# Patient Record
Sex: MALE | Race: WHITE | Employment: OTHER | URBAN - METROPOLITAN AREA
[De-identification: names, ages, dates, MRNs, and addresses within clinical notes are randomized per-mention and may not be internally consistent; named-entity substitution may affect disease eponyms.]

---

## 2017-08-16 PROCEDURE — 81003 URINALYSIS AUTO W/O SCOPE: CPT | Performed by: FAMILY MEDICINE

## 2018-01-04 PROBLEM — F17.200 SMOKER: Status: ACTIVE | Noted: 2018-01-04

## 2018-01-04 PROBLEM — R07.2 PRECORDIAL PAIN: Status: ACTIVE | Noted: 2018-01-04

## 2018-01-04 PROBLEM — R94.39 ABNORMAL STRESS TEST: Status: ACTIVE | Noted: 2018-01-04

## 2018-01-05 ENCOUNTER — APPOINTMENT (OUTPATIENT)
Dept: LAB | Facility: HOSPITAL | Age: 58
End: 2018-01-05
Attending: INTERNAL MEDICINE
Payer: COMMERCIAL

## 2018-01-05 PROCEDURE — 80048 BASIC METABOLIC PNL TOTAL CA: CPT

## 2018-01-05 PROCEDURE — 85027 COMPLETE CBC AUTOMATED: CPT

## 2018-01-05 PROCEDURE — 36415 COLL VENOUS BLD VENIPUNCTURE: CPT

## 2018-01-05 RX ORDER — SODIUM CHLORIDE 9 MG/ML
INJECTION, SOLUTION INTRAVENOUS ONCE
Status: DISCONTINUED | OUTPATIENT
Start: 2018-01-08 | End: 2018-01-08

## 2018-01-05 RX ORDER — CALCIUM POLYCARBOPHIL 625 MG
1 TABLET ORAL EVERY EVENING
COMMUNITY
End: 2019-02-18 | Stop reason: ALTCHOICE

## 2018-01-08 ENCOUNTER — HOSPITAL ENCOUNTER (OUTPATIENT)
Dept: INTERVENTIONAL RADIOLOGY/VASCULAR | Facility: HOSPITAL | Age: 58
Discharge: HOME | End: 2018-01-08
Attending: INTERNAL MEDICINE | Admitting: INTERNAL MEDICINE
Payer: COMMERCIAL

## 2018-01-08 VITALS
HEIGHT: 70 IN | SYSTOLIC BLOOD PRESSURE: 135 MMHG | OXYGEN SATURATION: 99 % | RESPIRATION RATE: 15 BRPM | WEIGHT: 270.06 LBS | HEART RATE: 71 BPM | DIASTOLIC BLOOD PRESSURE: 85 MMHG | BODY MASS INDEX: 38.66 KG/M2

## 2018-01-08 DIAGNOSIS — R07.9 CHEST PAIN: ICD-10-CM

## 2018-01-08 DIAGNOSIS — R94.39 ABNORMAL STRESS ECHO: ICD-10-CM

## 2018-01-08 PROCEDURE — 93005 ELECTROCARDIOGRAM TRACING: CPT

## 2018-01-08 PROCEDURE — 93010 ELECTROCARDIOGRAM REPORT: CPT | Performed by: INTERNAL MEDICINE

## 2018-01-08 PROCEDURE — 93458 L HRT ARTERY/VENTRICLE ANGIO: CPT

## 2018-01-08 PROCEDURE — B2151ZZ FLUOROSCOPY OF LEFT HEART USING LOW OSMOLAR CONTRAST: ICD-10-PCS | Performed by: INTERNAL MEDICINE

## 2018-01-08 PROCEDURE — 027034Z DILATION OF CORONARY ARTERY, ONE ARTERY WITH DRUG-ELUTING INTRALUMINAL DEVICE, PERCUTANEOUS APPROACH: ICD-10-PCS | Performed by: INTERNAL MEDICINE

## 2018-01-08 PROCEDURE — 92928 PRQ TCAT PLMT NTRAC ST 1 LES: CPT

## 2018-01-08 PROCEDURE — 99152 MOD SED SAME PHYS/QHP 5/>YRS: CPT

## 2018-01-08 PROCEDURE — 4A023N7 MEASUREMENT OF CARDIAC SAMPLING AND PRESSURE, LEFT HEART, PERCUTANEOUS APPROACH: ICD-10-PCS | Performed by: INTERNAL MEDICINE

## 2018-01-08 PROCEDURE — B2111ZZ FLUOROSCOPY OF MULTIPLE CORONARY ARTERIES USING LOW OSMOLAR CONTRAST: ICD-10-PCS | Performed by: INTERNAL MEDICINE

## 2018-01-08 PROCEDURE — 99153 MOD SED SAME PHYS/QHP EA: CPT

## 2018-01-08 RX ORDER — SODIUM CHLORIDE 9 MG/ML
INJECTION, SOLUTION INTRAVENOUS
Status: COMPLETED
Start: 2018-01-08 | End: 2018-01-08

## 2018-01-08 RX ORDER — NITROGLYCERIN 20 MG/100ML
INJECTION INTRAVENOUS
Status: DISCONTINUED
Start: 2018-01-08 | End: 2018-01-08 | Stop reason: WASHOUT

## 2018-01-08 RX ORDER — ASPIRIN 81 MG/1
81 TABLET, CHEWABLE ORAL DAILY
Status: DISCONTINUED | OUTPATIENT
Start: 2018-01-09 | End: 2018-01-08

## 2018-01-08 RX ORDER — SODIUM CHLORIDE 9 MG/ML
INJECTION, SOLUTION INTRAVENOUS CONTINUOUS
Status: ACTIVE | OUTPATIENT
Start: 2018-01-08 | End: 2018-01-08

## 2018-01-08 RX ORDER — SODIUM CHLORIDE 9 MG/ML
INJECTION, SOLUTION INTRAVENOUS
Status: DISCONTINUED
Start: 2018-01-08 | End: 2018-01-08

## 2018-01-08 RX ORDER — HEPARIN SODIUM 1000 [USP'U]/ML
INJECTION, SOLUTION INTRAVENOUS; SUBCUTANEOUS
Status: COMPLETED
Start: 2018-01-08 | End: 2018-01-08

## 2018-01-08 RX ORDER — CLOPIDOGREL BISULFATE 75 MG/1
75 TABLET ORAL DAILY
Qty: 30 TABLET | Refills: 0 | Status: SHIPPED | OUTPATIENT
Start: 2018-01-09 | End: 2018-04-27

## 2018-01-08 RX ORDER — CLOPIDOGREL BISULFATE 75 MG/1
75 TABLET ORAL DAILY
Status: DISCONTINUED | OUTPATIENT
Start: 2018-01-09 | End: 2018-01-08

## 2018-01-08 RX ORDER — LIDOCAINE HYDROCHLORIDE 20 MG/ML
INJECTION, SOLUTION EPIDURAL; INFILTRATION; INTRACAUDAL; PERINEURAL
Status: COMPLETED
Start: 2018-01-08 | End: 2018-01-08

## 2018-01-08 RX ORDER — MIDAZOLAM HYDROCHLORIDE 1 MG/ML
INJECTION INTRAMUSCULAR; INTRAVENOUS
Status: COMPLETED
Start: 2018-01-08 | End: 2018-01-08

## 2018-01-08 RX ORDER — CLOPIDOGREL BISULFATE 75 MG/1
TABLET ORAL
Status: COMPLETED
Start: 2018-01-08 | End: 2018-01-08

## 2018-01-08 NOTE — PROGRESS NOTES
Outpatient Surgery Brief Discharge Summary         Patient ID:  Elbert Toure  [id number]  59year old  10/12/1960    Discharge Diagnoses: unstable angina  Procedures: l,lv,cor  ptca circ, unsuccessful; stent lad    Discharged Condition: stable    Disposi

## 2018-01-09 NOTE — PROGRESS NOTES
1/15/2018  4:00 PM    Jayla Ann MD EL CARD       Elm Mcadoo  1/16/2018  11:00 AM   [facility] CARD ORIENTATION       [facility] CP REHAB   [facility]

## 2018-01-15 PROBLEM — I25.10 CORONARY ARTERY DISEASE INVOLVING NATIVE CORONARY ARTERY: Status: ACTIVE | Noted: 2018-01-15

## 2018-01-16 ENCOUNTER — CARDPULM VISIT (OUTPATIENT)
Dept: CARDIAC REHAB | Facility: HOSPITAL | Age: 58
End: 2018-01-16
Attending: INTERNAL MEDICINE
Payer: COMMERCIAL

## 2018-01-16 DIAGNOSIS — Z95.5 STENTED CORONARY ARTERY: ICD-10-CM

## 2018-01-16 PROCEDURE — 93798 PHYS/QHP OP CAR RHAB W/ECG: CPT

## 2018-01-31 ENCOUNTER — APPOINTMENT (OUTPATIENT)
Dept: CARDIAC REHAB | Facility: HOSPITAL | Age: 58
End: 2018-01-31
Attending: INTERNAL MEDICINE
Payer: COMMERCIAL

## 2018-02-06 ENCOUNTER — LAB ENCOUNTER (OUTPATIENT)
Dept: LAB | Facility: HOSPITAL | Age: 58
End: 2018-02-06
Attending: INTERNAL MEDICINE
Payer: COMMERCIAL

## 2018-02-06 DIAGNOSIS — I24.0 CORONARY ARTERY OCCLUSION (HCC): ICD-10-CM

## 2018-02-06 DIAGNOSIS — I25.10 CORONARY ARTERY DISEASE INVOLVING NATIVE CORONARY ARTERY OF NATIVE HEART WITHOUT ANGINA PECTORIS: ICD-10-CM

## 2018-02-06 LAB
ALBUMIN SERPL BCP-MCNC: 4.1 G/DL (ref 3.5–4.8)
ALBUMIN/GLOB SERPL: 1.2 {RATIO} (ref 1–2)
ALP SERPL-CCNC: 41 U/L (ref 32–100)
ALT SERPL-CCNC: 25 U/L (ref 17–63)
ANION GAP SERPL CALC-SCNC: 7 MMOL/L (ref 0–18)
AST SERPL-CCNC: 25 U/L (ref 15–41)
BASOPHILS # BLD: 0 K/UL (ref 0–0.2)
BASOPHILS NFR BLD: 1 %
BILIRUB SERPL-MCNC: 0.4 MG/DL (ref 0.3–1.2)
BUN SERPL-MCNC: 11 MG/DL (ref 8–20)
BUN/CREAT SERPL: 9 (ref 10–20)
CALCIUM SERPL-MCNC: 9.2 MG/DL (ref 8.5–10.5)
CHLORIDE SERPL-SCNC: 103 MMOL/L (ref 95–110)
CO2 SERPL-SCNC: 27 MMOL/L (ref 22–32)
CREAT SERPL-MCNC: 1.22 MG/DL (ref 0.5–1.5)
EOSINOPHIL # BLD: 0.2 K/UL (ref 0–0.7)
EOSINOPHIL NFR BLD: 3 %
ERYTHROCYTE [DISTWIDTH] IN BLOOD BY AUTOMATED COUNT: 13.9 % (ref 11–15)
GLOBULIN PLAS-MCNC: 3.5 G/DL (ref 2.5–3.7)
GLUCOSE SERPL-MCNC: 96 MG/DL (ref 70–99)
HCT VFR BLD AUTO: 42.4 % (ref 41–52)
HGB BLD-MCNC: 14.2 G/DL (ref 13.5–17.5)
LYMPHOCYTES # BLD: 1.8 K/UL (ref 1–4)
LYMPHOCYTES NFR BLD: 23 %
MCH RBC QN AUTO: 30.4 PG (ref 27–32)
MCHC RBC AUTO-ENTMCNC: 33.6 G/DL (ref 32–37)
MCV RBC AUTO: 90.5 FL (ref 80–100)
MONOCYTES # BLD: 0.8 K/UL (ref 0–1)
MONOCYTES NFR BLD: 10 %
NEUTROPHILS # BLD AUTO: 4.9 K/UL (ref 1.8–7.7)
NEUTROPHILS NFR BLD: 64 %
OSMOLALITY UR CALC.SUM OF ELEC: 283 MOSM/KG (ref 275–295)
PATIENT FASTING: NO
PLATELET # BLD AUTO: 240 K/UL (ref 140–400)
PMV BLD AUTO: 8.3 FL (ref 7.4–10.3)
POTASSIUM SERPL-SCNC: 4 MMOL/L (ref 3.3–5.1)
PROT SERPL-MCNC: 7.6 G/DL (ref 5.9–8.4)
RBC # BLD AUTO: 4.68 M/UL (ref 4.5–5.9)
SODIUM SERPL-SCNC: 137 MMOL/L (ref 136–144)
WBC # BLD AUTO: 7.7 K/UL (ref 4–11)

## 2018-02-06 PROCEDURE — 36415 COLL VENOUS BLD VENIPUNCTURE: CPT

## 2018-02-06 PROCEDURE — 80053 COMPREHEN METABOLIC PANEL: CPT

## 2018-02-06 PROCEDURE — 85025 COMPLETE CBC W/AUTO DIFF WBC: CPT

## 2018-02-06 NOTE — PROGRESS NOTES
2/7/2018   8:00 AM    Vanessa Sandoval MD        [facility]  2/7/2018   6:00 PM    [facility] CARD PHASE 2 RM        [facility] CP REHAB   [facility]  2/9/2018   6:00 PM    [facility] CARD PHASE 2 [facility] CP REHAB   [facility]  2/12/2018  6:00 PM    CF CARD PHASE 2 [facility]  4/6/2018   6:00 PM    [facility] CARD PHASE 2 RM        [facility] CP REHAB   [facility]  4/9/2018   6:00 PM    [facility] CARD PHASE 2 RM        [facility] CP REHAB   [facility]  4/11/2018  6:00 PM    [facility] CARD PHASE 2 RM        [facility] CP REHAB   [facility]  4/13/2018  6:00 PM    [facility] CARD PHAS

## 2018-02-06 NOTE — PROGRESS NOTES
2/7/2018   8:00 AM    Iwona Almonte MD        [facility]  2/7/2018   6:00 PM    [facility] CARD PHASE 2 RM        CF CP REHAB   [facility]  2/9/2018   6:00 PM    [facility] CARD PHASE 2         PEDRO CP REHAB   [facility]  2/12/2018  6:00 PM    [facility] CARD PHASE 2 [facility]  4/6/2018   6:00 PM    [facility] CARD PHASE 2 RM        [facility] CP REHAB   [facility]  4/9/2018   6:00 PM    [facility] CARD PHASE 2 RM        [facility] CP REHAB   [facility]  4/11/2018  6:00 PM    [facility] CARD PHASE 2 RM        [facility] CP REHAB   [facility]  4/13/2018  6:00 PM    [facility] CARD PHAS

## 2018-02-07 ENCOUNTER — HOSPITAL (OUTPATIENT)
Dept: OTHER | Age: 58
End: 2018-02-07
Attending: INTERNAL MEDICINE

## 2018-02-09 ENCOUNTER — APPOINTMENT (OUTPATIENT)
Dept: CARDIAC REHAB | Facility: HOSPITAL | Age: 58
End: 2018-02-09
Attending: INTERNAL MEDICINE
Payer: COMMERCIAL

## 2018-02-14 ENCOUNTER — CARDPULM VISIT (OUTPATIENT)
Dept: CARDIAC REHAB | Facility: HOSPITAL | Age: 58
End: 2018-02-14
Attending: INTERNAL MEDICINE
Payer: COMMERCIAL

## 2018-02-14 PROCEDURE — 93798 PHYS/QHP OP CAR RHAB W/ECG: CPT

## 2018-02-16 ENCOUNTER — CARDPULM VISIT (OUTPATIENT)
Dept: CARDIAC REHAB | Facility: HOSPITAL | Age: 58
End: 2018-02-16
Attending: INTERNAL MEDICINE
Payer: COMMERCIAL

## 2018-02-16 PROCEDURE — 93798 PHYS/QHP OP CAR RHAB W/ECG: CPT

## 2018-02-19 ENCOUNTER — CARDPULM VISIT (OUTPATIENT)
Dept: CARDIAC REHAB | Facility: HOSPITAL | Age: 58
End: 2018-02-19
Attending: INTERNAL MEDICINE
Payer: COMMERCIAL

## 2018-02-19 PROCEDURE — 93798 PHYS/QHP OP CAR RHAB W/ECG: CPT

## 2018-02-21 ENCOUNTER — CARDPULM VISIT (OUTPATIENT)
Dept: CARDIAC REHAB | Facility: HOSPITAL | Age: 58
End: 2018-02-21
Attending: INTERNAL MEDICINE
Payer: COMMERCIAL

## 2018-02-21 PROCEDURE — 93798 PHYS/QHP OP CAR RHAB W/ECG: CPT

## 2018-02-23 ENCOUNTER — CARDPULM VISIT (OUTPATIENT)
Dept: CARDIAC REHAB | Facility: HOSPITAL | Age: 58
End: 2018-02-23
Attending: INTERNAL MEDICINE
Payer: COMMERCIAL

## 2018-02-23 PROCEDURE — 93798 PHYS/QHP OP CAR RHAB W/ECG: CPT

## 2018-02-26 ENCOUNTER — CARDPULM VISIT (OUTPATIENT)
Dept: CARDIAC REHAB | Facility: HOSPITAL | Age: 58
End: 2018-02-26
Attending: INTERNAL MEDICINE
Payer: COMMERCIAL

## 2018-02-26 PROCEDURE — 93798 PHYS/QHP OP CAR RHAB W/ECG: CPT

## 2018-02-28 ENCOUNTER — CARDPULM VISIT (OUTPATIENT)
Dept: CARDIAC REHAB | Facility: HOSPITAL | Age: 58
End: 2018-02-28
Attending: INTERNAL MEDICINE
Payer: COMMERCIAL

## 2018-02-28 PROCEDURE — 93798 PHYS/QHP OP CAR RHAB W/ECG: CPT

## 2018-03-09 ENCOUNTER — CARDPULM VISIT (OUTPATIENT)
Dept: CARDIAC REHAB | Facility: HOSPITAL | Age: 58
End: 2018-03-09
Attending: INTERNAL MEDICINE
Payer: COMMERCIAL

## 2018-03-09 PROCEDURE — 93798 PHYS/QHP OP CAR RHAB W/ECG: CPT

## 2018-03-12 ENCOUNTER — CARDPULM VISIT (OUTPATIENT)
Dept: CARDIAC REHAB | Facility: HOSPITAL | Age: 58
End: 2018-03-12
Attending: INTERNAL MEDICINE
Payer: COMMERCIAL

## 2018-03-12 PROCEDURE — 93798 PHYS/QHP OP CAR RHAB W/ECG: CPT

## 2018-03-16 PROCEDURE — 88305 TISSUE EXAM BY PATHOLOGIST: CPT | Performed by: INTERNAL MEDICINE

## 2018-03-19 ENCOUNTER — CARDPULM VISIT (OUTPATIENT)
Dept: CARDIAC REHAB | Facility: HOSPITAL | Age: 58
End: 2018-03-19
Attending: INTERNAL MEDICINE
Payer: COMMERCIAL

## 2018-03-19 PROCEDURE — 93798 PHYS/QHP OP CAR RHAB W/ECG: CPT

## 2018-03-21 ENCOUNTER — CARDPULM VISIT (OUTPATIENT)
Dept: CARDIAC REHAB | Facility: HOSPITAL | Age: 58
End: 2018-03-21
Attending: INTERNAL MEDICINE
Payer: COMMERCIAL

## 2018-03-21 PROCEDURE — 93798 PHYS/QHP OP CAR RHAB W/ECG: CPT

## 2018-05-11 ENCOUNTER — CARDPULM VISIT (OUTPATIENT)
Dept: CARDIAC REHAB | Facility: HOSPITAL | Age: 58
End: 2018-05-11
Attending: INTERNAL MEDICINE
Payer: COMMERCIAL

## 2018-05-11 PROCEDURE — 93798 PHYS/QHP OP CAR RHAB W/ECG: CPT

## 2018-05-16 ENCOUNTER — CARDPULM VISIT (OUTPATIENT)
Dept: CARDIAC REHAB | Facility: HOSPITAL | Age: 58
End: 2018-05-16
Attending: INTERNAL MEDICINE
Payer: COMMERCIAL

## 2018-05-16 PROCEDURE — 93798 PHYS/QHP OP CAR RHAB W/ECG: CPT

## 2018-05-21 ENCOUNTER — CARDPULM VISIT (OUTPATIENT)
Dept: CARDIAC REHAB | Facility: HOSPITAL | Age: 58
End: 2018-05-21
Attending: INTERNAL MEDICINE
Payer: COMMERCIAL

## 2018-05-21 PROCEDURE — 93798 PHYS/QHP OP CAR RHAB W/ECG: CPT

## 2018-05-23 ENCOUNTER — CARDPULM VISIT (OUTPATIENT)
Dept: CARDIAC REHAB | Facility: HOSPITAL | Age: 58
End: 2018-05-23
Attending: INTERNAL MEDICINE
Payer: COMMERCIAL

## 2018-05-23 PROCEDURE — 93798 PHYS/QHP OP CAR RHAB W/ECG: CPT

## 2018-05-25 ENCOUNTER — APPOINTMENT (OUTPATIENT)
Dept: CARDIAC REHAB | Facility: HOSPITAL | Age: 58
End: 2018-05-25
Attending: INTERNAL MEDICINE
Payer: COMMERCIAL

## 2018-05-30 ENCOUNTER — APPOINTMENT (OUTPATIENT)
Dept: CARDIAC REHAB | Facility: HOSPITAL | Age: 58
End: 2018-05-30
Attending: INTERNAL MEDICINE
Payer: COMMERCIAL

## 2018-06-01 ENCOUNTER — APPOINTMENT (OUTPATIENT)
Dept: CARDIAC REHAB | Facility: HOSPITAL | Age: 58
End: 2018-06-01
Attending: INTERNAL MEDICINE
Payer: COMMERCIAL

## 2018-06-04 ENCOUNTER — APPOINTMENT (OUTPATIENT)
Dept: CARDIAC REHAB | Facility: HOSPITAL | Age: 58
End: 2018-06-04
Attending: INTERNAL MEDICINE
Payer: COMMERCIAL

## 2018-06-06 ENCOUNTER — APPOINTMENT (OUTPATIENT)
Dept: CARDIAC REHAB | Facility: HOSPITAL | Age: 58
End: 2018-06-06
Attending: INTERNAL MEDICINE
Payer: COMMERCIAL

## 2018-06-08 ENCOUNTER — APPOINTMENT (OUTPATIENT)
Dept: CARDIAC REHAB | Facility: HOSPITAL | Age: 58
End: 2018-06-08
Attending: INTERNAL MEDICINE
Payer: COMMERCIAL

## 2018-06-11 ENCOUNTER — APPOINTMENT (OUTPATIENT)
Dept: CARDIAC REHAB | Facility: HOSPITAL | Age: 58
End: 2018-06-11
Attending: INTERNAL MEDICINE
Payer: COMMERCIAL

## 2018-06-13 ENCOUNTER — APPOINTMENT (OUTPATIENT)
Dept: CARDIAC REHAB | Facility: HOSPITAL | Age: 58
End: 2018-06-13
Attending: INTERNAL MEDICINE
Payer: COMMERCIAL

## 2018-06-15 ENCOUNTER — APPOINTMENT (OUTPATIENT)
Dept: CARDIAC REHAB | Facility: HOSPITAL | Age: 58
End: 2018-06-15
Attending: INTERNAL MEDICINE
Payer: COMMERCIAL

## 2018-06-15 PROCEDURE — 81001 URINALYSIS AUTO W/SCOPE: CPT | Performed by: FAMILY MEDICINE

## 2018-06-18 ENCOUNTER — APPOINTMENT (OUTPATIENT)
Dept: CARDIAC REHAB | Facility: HOSPITAL | Age: 58
End: 2018-06-18
Attending: INTERNAL MEDICINE
Payer: COMMERCIAL

## 2018-06-20 ENCOUNTER — APPOINTMENT (OUTPATIENT)
Dept: CARDIAC REHAB | Facility: HOSPITAL | Age: 58
End: 2018-06-20
Attending: INTERNAL MEDICINE
Payer: COMMERCIAL

## 2018-06-22 ENCOUNTER — APPOINTMENT (OUTPATIENT)
Dept: CARDIAC REHAB | Facility: HOSPITAL | Age: 58
End: 2018-06-22
Attending: INTERNAL MEDICINE
Payer: COMMERCIAL

## 2018-06-25 ENCOUNTER — APPOINTMENT (OUTPATIENT)
Dept: CARDIAC REHAB | Facility: HOSPITAL | Age: 58
End: 2018-06-25
Attending: INTERNAL MEDICINE
Payer: COMMERCIAL

## 2018-06-27 ENCOUNTER — APPOINTMENT (OUTPATIENT)
Dept: CARDIAC REHAB | Facility: HOSPITAL | Age: 58
End: 2018-06-27
Attending: INTERNAL MEDICINE
Payer: COMMERCIAL

## 2018-06-29 ENCOUNTER — APPOINTMENT (OUTPATIENT)
Dept: CARDIAC REHAB | Facility: HOSPITAL | Age: 58
End: 2018-06-29
Attending: INTERNAL MEDICINE
Payer: COMMERCIAL

## 2018-07-02 ENCOUNTER — APPOINTMENT (OUTPATIENT)
Dept: CARDIAC REHAB | Facility: HOSPITAL | Age: 58
End: 2018-07-02
Attending: INTERNAL MEDICINE
Payer: COMMERCIAL

## 2018-07-06 ENCOUNTER — APPOINTMENT (OUTPATIENT)
Dept: CARDIAC REHAB | Facility: HOSPITAL | Age: 58
End: 2018-07-06
Attending: INTERNAL MEDICINE
Payer: COMMERCIAL

## 2018-07-09 ENCOUNTER — APPOINTMENT (OUTPATIENT)
Dept: CARDIAC REHAB | Facility: HOSPITAL | Age: 58
End: 2018-07-09
Attending: INTERNAL MEDICINE
Payer: COMMERCIAL

## 2018-07-11 ENCOUNTER — APPOINTMENT (OUTPATIENT)
Dept: CARDIAC REHAB | Facility: HOSPITAL | Age: 58
End: 2018-07-11
Attending: INTERNAL MEDICINE
Payer: COMMERCIAL

## 2018-07-13 ENCOUNTER — APPOINTMENT (OUTPATIENT)
Dept: CARDIAC REHAB | Facility: HOSPITAL | Age: 58
End: 2018-07-13
Attending: INTERNAL MEDICINE
Payer: COMMERCIAL

## 2018-07-16 ENCOUNTER — APPOINTMENT (OUTPATIENT)
Dept: CARDIAC REHAB | Facility: HOSPITAL | Age: 58
End: 2018-07-16
Attending: INTERNAL MEDICINE
Payer: COMMERCIAL

## 2018-07-18 ENCOUNTER — APPOINTMENT (OUTPATIENT)
Dept: CARDIAC REHAB | Facility: HOSPITAL | Age: 58
End: 2018-07-18
Attending: INTERNAL MEDICINE
Payer: COMMERCIAL

## 2018-07-20 ENCOUNTER — APPOINTMENT (OUTPATIENT)
Dept: CARDIAC REHAB | Facility: HOSPITAL | Age: 58
End: 2018-07-20
Attending: INTERNAL MEDICINE
Payer: COMMERCIAL

## 2018-07-23 ENCOUNTER — APPOINTMENT (OUTPATIENT)
Dept: CARDIAC REHAB | Facility: HOSPITAL | Age: 58
End: 2018-07-23
Attending: INTERNAL MEDICINE
Payer: COMMERCIAL

## 2018-07-25 ENCOUNTER — APPOINTMENT (OUTPATIENT)
Dept: CARDIAC REHAB | Facility: HOSPITAL | Age: 58
End: 2018-07-25
Attending: INTERNAL MEDICINE
Payer: COMMERCIAL

## 2018-07-27 ENCOUNTER — APPOINTMENT (OUTPATIENT)
Dept: CARDIAC REHAB | Facility: HOSPITAL | Age: 58
End: 2018-07-27
Attending: INTERNAL MEDICINE
Payer: COMMERCIAL

## 2018-07-30 ENCOUNTER — APPOINTMENT (OUTPATIENT)
Dept: CARDIAC REHAB | Facility: HOSPITAL | Age: 58
End: 2018-07-30
Attending: INTERNAL MEDICINE
Payer: COMMERCIAL

## 2018-08-01 ENCOUNTER — APPOINTMENT (OUTPATIENT)
Dept: CARDIAC REHAB | Facility: HOSPITAL | Age: 58
End: 2018-08-01
Attending: INTERNAL MEDICINE
Payer: COMMERCIAL

## 2018-08-03 ENCOUNTER — APPOINTMENT (OUTPATIENT)
Dept: CARDIAC REHAB | Facility: HOSPITAL | Age: 58
End: 2018-08-03
Attending: INTERNAL MEDICINE
Payer: COMMERCIAL

## 2018-08-06 ENCOUNTER — APPOINTMENT (OUTPATIENT)
Dept: CARDIAC REHAB | Facility: HOSPITAL | Age: 58
End: 2018-08-06
Attending: INTERNAL MEDICINE
Payer: COMMERCIAL

## 2018-08-08 ENCOUNTER — APPOINTMENT (OUTPATIENT)
Dept: CARDIAC REHAB | Facility: HOSPITAL | Age: 58
End: 2018-08-08
Attending: INTERNAL MEDICINE
Payer: COMMERCIAL

## 2018-08-10 ENCOUNTER — APPOINTMENT (OUTPATIENT)
Dept: CARDIAC REHAB | Facility: HOSPITAL | Age: 58
End: 2018-08-10
Attending: INTERNAL MEDICINE
Payer: COMMERCIAL

## 2019-01-09 PROBLEM — L40.9 PSORIASIS: Status: ACTIVE | Noted: 2019-01-09

## 2019-01-09 PROBLEM — R73.9 ELEVATED BLOOD SUGAR: Status: ACTIVE | Noted: 2019-01-09

## 2019-02-18 PROCEDURE — 86431 RHEUMATOID FACTOR QUANT: CPT | Performed by: INTERNAL MEDICINE

## 2019-02-18 PROCEDURE — 89060 EXAM SYNOVIAL FLUID CRYSTALS: CPT | Performed by: INTERNAL MEDICINE

## 2019-02-18 PROCEDURE — 86200 CCP ANTIBODY: CPT | Performed by: INTERNAL MEDICINE

## 2019-02-18 PROCEDURE — 89051 BODY FLUID CELL COUNT: CPT | Performed by: INTERNAL MEDICINE

## 2019-03-10 PROBLEM — M17.12 OSTEOARTHRITIS OF LEFT KNEE, UNSPECIFIED OSTEOARTHRITIS TYPE: Status: ACTIVE | Noted: 2019-03-10

## 2019-03-10 PROBLEM — M17.11 PRIMARY OSTEOARTHRITIS OF RIGHT KNEE: Status: ACTIVE | Noted: 2019-03-10

## 2019-03-10 PROBLEM — G56.03 CARPAL TUNNEL SYNDROME, BILATERAL: Status: ACTIVE | Noted: 2019-03-10

## 2019-03-10 PROBLEM — L30.9 DERMATITIS: Status: ACTIVE | Noted: 2019-03-10

## 2019-07-24 PROBLEM — N40.1 BENIGN PROSTATIC HYPERPLASIA WITH NOCTURIA: Status: ACTIVE | Noted: 2019-07-24

## 2019-07-24 PROBLEM — R35.1 BENIGN PROSTATIC HYPERPLASIA WITH NOCTURIA: Status: ACTIVE | Noted: 2019-07-24

## 2019-07-24 PROCEDURE — 81001 URINALYSIS AUTO W/SCOPE: CPT | Performed by: FAMILY MEDICINE

## 2019-09-10 PROCEDURE — 82397 CHEMILUMINESCENT ASSAY: CPT | Performed by: NURSE PRACTITIONER

## 2019-09-13 PROBLEM — E55.9 VITAMIN D DEFICIENCY: Status: ACTIVE | Noted: 2019-09-13

## 2020-06-15 PROCEDURE — 88305 TISSUE EXAM BY PATHOLOGIST: CPT | Performed by: INTERNAL MEDICINE

## 2020-08-11 PROBLEM — R73.9 ELEVATED BLOOD SUGAR: Status: RESOLVED | Noted: 2019-01-09 | Resolved: 2020-08-11

## 2020-08-11 PROBLEM — M17.11 PRIMARY OSTEOARTHRITIS OF RIGHT KNEE: Status: RESOLVED | Noted: 2019-03-10 | Resolved: 2020-08-11

## 2020-08-11 PROBLEM — M17.12 OSTEOARTHRITIS OF LEFT KNEE, UNSPECIFIED OSTEOARTHRITIS TYPE: Status: RESOLVED | Noted: 2019-03-10 | Resolved: 2020-08-11

## 2020-08-11 PROBLEM — L30.9 DERMATITIS: Status: RESOLVED | Noted: 2019-03-10 | Resolved: 2020-08-11

## 2020-12-05 ENCOUNTER — APPOINTMENT (OUTPATIENT)
Dept: GENERAL RADIOLOGY | Facility: HOSPITAL | Age: 60
DRG: 287 | End: 2020-12-05
Attending: EMERGENCY MEDICINE
Payer: COMMERCIAL

## 2020-12-05 ENCOUNTER — HOSPITAL ENCOUNTER (INPATIENT)
Facility: HOSPITAL | Age: 60
LOS: 2 days | Discharge: HOME | DRG: 287 | End: 2020-12-07
Attending: EMERGENCY MEDICINE | Admitting: INTERNAL MEDICINE
Payer: COMMERCIAL

## 2020-12-05 DIAGNOSIS — I20.0 UNSTABLE ANGINA (HCC): Primary | ICD-10-CM

## 2020-12-05 DIAGNOSIS — I25.10 CORONARY ARTERY DISEASE INVOLVING NATIVE CORONARY ARTERY OF NATIVE HEART WITHOUT ANGINA PECTORIS: ICD-10-CM

## 2020-12-05 PROCEDURE — 85025 COMPLETE CBC W/AUTO DIFF WBC: CPT | Performed by: EMERGENCY MEDICINE

## 2020-12-05 PROCEDURE — 36415 COLL VENOUS BLD VENIPUNCTURE: CPT

## 2020-12-05 PROCEDURE — 93005 ELECTROCARDIOGRAM TRACING: CPT

## 2020-12-05 PROCEDURE — 80048 BASIC METABOLIC PNL TOTAL CA: CPT | Performed by: EMERGENCY MEDICINE

## 2020-12-05 PROCEDURE — 93010 ELECTROCARDIOGRAM REPORT: CPT | Performed by: EMERGENCY MEDICINE

## 2020-12-05 PROCEDURE — 85730 THROMBOPLASTIN TIME PARTIAL: CPT | Performed by: EMERGENCY MEDICINE

## 2020-12-05 PROCEDURE — 99285 EMERGENCY DEPT VISIT HI MDM: CPT

## 2020-12-05 PROCEDURE — 71045 X-RAY EXAM CHEST 1 VIEW: CPT | Performed by: EMERGENCY MEDICINE

## 2020-12-05 PROCEDURE — 84484 ASSAY OF TROPONIN QUANT: CPT | Performed by: EMERGENCY MEDICINE

## 2020-12-05 RX ORDER — FAMOTIDINE 20 MG/1
20 TABLET ORAL 2 TIMES DAILY PRN
Status: DISCONTINUED | OUTPATIENT
Start: 2020-12-05 | End: 2020-12-08

## 2020-12-05 RX ORDER — CLOPIDOGREL BISULFATE 75 MG/1
75 TABLET ORAL DAILY
Status: DISCONTINUED | OUTPATIENT
Start: 2020-12-06 | End: 2020-12-08

## 2020-12-05 RX ORDER — ATORVASTATIN CALCIUM 40 MG/1
40 TABLET, FILM COATED ORAL NIGHTLY
Status: DISCONTINUED | OUTPATIENT
Start: 2020-12-05 | End: 2020-12-08

## 2020-12-05 RX ORDER — ASPIRIN 81 MG/1
324 TABLET, CHEWABLE ORAL ONCE
Status: COMPLETED | OUTPATIENT
Start: 2020-12-05 | End: 2020-12-05

## 2020-12-05 RX ORDER — METOPROLOL SUCCINATE 50 MG/1
50 TABLET, EXTENDED RELEASE ORAL NIGHTLY
Status: DISCONTINUED | OUTPATIENT
Start: 2020-12-05 | End: 2020-12-08

## 2020-12-05 RX ORDER — TAMSULOSIN HYDROCHLORIDE 0.4 MG/1
0.4 CAPSULE ORAL NIGHTLY
Status: DISCONTINUED | OUTPATIENT
Start: 2020-12-05 | End: 2020-12-08

## 2020-12-05 RX ORDER — HEPARIN SODIUM 5000 [USP'U]/ML
7500 INJECTION, SOLUTION INTRAVENOUS; SUBCUTANEOUS EVERY 8 HOURS SCHEDULED
Status: DISPENSED | OUTPATIENT
Start: 2020-12-05 | End: 2020-12-07

## 2020-12-05 RX ORDER — METOPROLOL SUCCINATE 50 MG/1
50 TABLET, EXTENDED RELEASE ORAL DAILY
Status: DISCONTINUED | OUTPATIENT
Start: 2020-12-06 | End: 2020-12-05

## 2020-12-05 RX ORDER — ACETAMINOPHEN 325 MG/1
650 TABLET ORAL EVERY 6 HOURS PRN
Status: DISCONTINUED | OUTPATIENT
Start: 2020-12-05 | End: 2020-12-08

## 2020-12-05 RX ORDER — NITROGLYCERIN 0.4 MG/1
0.4 TABLET SUBLINGUAL EVERY 5 MIN PRN
Status: DISCONTINUED | OUTPATIENT
Start: 2020-12-05 | End: 2020-12-08

## 2020-12-05 RX ORDER — ENALAPRILAT 2.5 MG/2ML
0.62 INJECTION INTRAVENOUS EVERY 6 HOURS PRN
Status: DISCONTINUED | OUTPATIENT
Start: 2020-12-05 | End: 2020-12-08

## 2020-12-05 NOTE — PROGRESS NOTES
[facility] Pharmacy Note:  Anticoagulation Weight Dose Adjustment for heparin    Prudence Baig is a 56year old patient who has been prescribed heparin 5,000 units every 8 hours. Estimated Creatinine Clearance: 60.4 mL/min (based on SCr of 1.29 mg/dL).  Body

## 2020-12-05 NOTE — ED NOTES
Orders for admission, patient is aware of plan and ready to go upstairs. Any questions, please call ED MOHIT ohara  at extension [phone].    Type of COVID test sent:rapid, negative  COVID Suspicion level: Low/High: low  Titratable drug(s) infusing:saline rogers

## 2020-12-05 NOTE — ED PROVIDER NOTES
Patient Seen in: [facility] AND [facility] Emergency Department      History   Patient presents with:  Difficulty Breathing    Stated Complaint: SOB     HPI     56year old male with multiple medical issues including obesity, hypertension, hyperlipidemia, CAD s/ Yes      Alcohol/week: 3.0 standard drinks      Types: 2 Cans of beer, 1 Shots of liquor per week      Frequency: 4 or more times a week      Drinks per session: 3 or 4      Comment: occasionally 3 nights per week    Drug use: Yes      Types: Cannabis edema.   Skin:     General: Skin is warm and dry. Findings: No rash. Neurological:      Mental Status: He is alert and oriented to person, place, and time. Sensory: No sensory deficit.    Psychiatric:         Behavior: Behavior normal. Behavior (CST): Keon Diaz MD on 12/05/2020 at 3:00 PM            Radiology exams  Viewed and reviewed by myself and findings discussed with patient including need for follow up    Critical Care:  I spent a total of 30 minutes of critical care time in obtain Unknown

## 2020-12-05 NOTE — ED NOTES
States he feels a little better and less short of breath after the nitroglycerin. md aware, ok with second dose.

## 2020-12-05 NOTE — ED INITIAL ASSESSMENT (HPI)
Pt reports dyspnea with exertion while doing yardwork, had to stop and go inside and started feeling \"Hot flashes\", dizziness, weakness, and \"not feeling right\". A/ox4, speech clear.

## 2020-12-05 NOTE — ED NOTES
rec'd pt from triage, ambulatory. C/o chest pressure and feeling shortness of breath while doing yardwork pta. States he needed to take multiple breaks during the 1 hour of work he did.  States he still wasn't \"feeling right\" after he rested after which i

## 2020-12-05 NOTE — H&P
[facility] Hospitalist H&P       CC: Shortness of breath     PCP: Eduard Khanna MD    History of Present Illness:   56year old male wit history of hypertension, coronary artery disease s/p PCI to KATHY in 2018, obesity, hx of tobacco use, hyperlipidemia, BPH, 37        Start date: 1972        Quit date: 2018        Years since quittin.9      Smokeless tobacco: Never Used      Tobacco comment: current smoker, did quit from [id number]    Alcohol use:  Yes      Alcohol/week: 3.0 standard drinks      Type tobacco use, hyperlipidemia, BPH, psoriasis, who presents to the hospital for evaluation of dyspnea. Dyspnea, chest discomfort  -chest pain now resolved. Main symptom that brought him in was worsening dyspnea. He states it was worse than his baseline.

## 2020-12-06 PROCEDURE — 80048 BASIC METABOLIC PNL TOTAL CA: CPT | Performed by: INTERNAL MEDICINE

## 2020-12-06 PROCEDURE — 85025 COMPLETE CBC W/AUTO DIFF WBC: CPT | Performed by: INTERNAL MEDICINE

## 2020-12-06 PROCEDURE — 84484 ASSAY OF TROPONIN QUANT: CPT | Performed by: INTERNAL MEDICINE

## 2020-12-06 RX ORDER — POTASSIUM CHLORIDE 20 MEQ/1
40 TABLET, EXTENDED RELEASE ORAL ONCE
Status: COMPLETED | OUTPATIENT
Start: 2020-12-06 | End: 2020-12-06

## 2020-12-06 NOTE — PLAN OF CARE
Plan for [facility] Monday; Call light in reach. Problem: Patient Centered Care  Goal: Patient preferences are identified and integrated in the patient's plan of care  Description: Interventions:  - What would you like us to know as we care for you?  I live wi effectiveness of vasoactive medications to optimize hemodynamic stability  - Monitor arterial and/or venous puncture sites for bleeding and/or hematoma  - Assess quality of pulses, skin color and temperature  - Assess for signs of decreased coronary artery

## 2020-12-06 NOTE — PROGRESS NOTES
[facility]  Cardiology Progress Note    Boubacar Anthony Patient Status:  Inpatient    10/12/1960 MRN [id number]   Location [facility] 3W/SW Attending Larry Villa DO   Hosp Day # 1 PCP Jose Luis Galloway MD     Assessment:  Chest pain, MEEK-sx i atorvastatin  40 mg Oral Nightly   • Clopidogrel Bisulfate  75 mg Oral Daily   • tamsulosin HCl  0.4 mg Oral Nightly   • Heparin Sodium (Porcine)  7,500 Units Subcutaneous Q8H [address]   • Metoprolol Succinate ER  50 mg Oral Nightly           Recent Labs     12

## 2020-12-06 NOTE — CONSULTS
[facility]  Report of Consultation    Virgilio Minium Patient Status:  Inpatient    10/12/1960 MRN [id number]   Location [facility] 3W/SW Attending Ryann Diaz, DO   Hosp Day # 0 PCP Eduard Khanna MD     Reason for Consultation:  Chest gabriela time.    History:  Past Medical History:   Diagnosis Date   • Central serous retinopathy, left 2006    sees ophthalmology yearly at [facility], had laser treatment   • Contact dermatitis    • Essential hypertension    • Hyperlipidemia    • Lumbar stenosis 2014 (PEPCID) tab 20 mg, 20 mg, Oral, BID PRN  •  [START ON 12/6/2020] Metoprolol Succinate ER (Toprol XL) 24 hr tab 50 mg, 50 mg, Oral, Daily  •  tamsulosin HCl (FLOMAX) cap 0.4 mg, 0.4 mg, Oral, Nightly  •  Heparin Sodium (Porcine) 5000 UNIT/ML injection 7,50

## 2020-12-06 NOTE — PROGRESS NOTES
[facility] Hospitalist Progress Note     CC: Hospital Follow up    PCP: Sabrina Hernandez MD       Assessment/Plan:   56year old male wit history of hypertension, coronary artery disease s/p KATHY to prox LAD in 2018, obesity, hx of tobacco use, hyperlipidemia, BPH, appropriate affect     Data Review:       Labs:     Recent Labs   Lab 12/05/20  1433   RBC 4.61   HGB 13.6   HCT 41.0   MCV 88.9   MCH 29.5   MCHC 33.2   RDW 13.4   NEPRELIM 6.67   WBC 9.4   .0         Recent Labs   Lab 12/05/20  1433   *   B

## 2020-12-07 ENCOUNTER — APPOINTMENT (OUTPATIENT)
Dept: CV DIAGNOSTICS | Facility: HOSPITAL | Age: 60
DRG: 287 | End: 2020-12-07
Attending: INTERNAL MEDICINE
Payer: COMMERCIAL

## 2020-12-07 ENCOUNTER — APPOINTMENT (OUTPATIENT)
Dept: INTERVENTIONAL RADIOLOGY/VASCULAR | Facility: HOSPITAL | Age: 60
DRG: 287 | End: 2020-12-07
Attending: INTERNAL MEDICINE
Payer: COMMERCIAL

## 2020-12-07 VITALS
WEIGHT: 309.81 LBS | RESPIRATION RATE: 16 BRPM | HEIGHT: 69 IN | SYSTOLIC BLOOD PRESSURE: 129 MMHG | BODY MASS INDEX: 45.89 KG/M2 | DIASTOLIC BLOOD PRESSURE: 81 MMHG | OXYGEN SATURATION: 96 % | TEMPERATURE: 98 F | HEART RATE: 83 BPM

## 2020-12-07 PROCEDURE — 93306 TTE W/DOPPLER COMPLETE: CPT | Performed by: INTERNAL MEDICINE

## 2020-12-07 PROCEDURE — B2151ZZ FLUOROSCOPY OF LEFT HEART USING LOW OSMOLAR CONTRAST: ICD-10-PCS | Performed by: INTERNAL MEDICINE

## 2020-12-07 PROCEDURE — 93458 L HRT ARTERY/VENTRICLE ANGIO: CPT

## 2020-12-07 PROCEDURE — B2111ZZ FLUOROSCOPY OF MULTIPLE CORONARY ARTERIES USING LOW OSMOLAR CONTRAST: ICD-10-PCS | Performed by: INTERNAL MEDICINE

## 2020-12-07 PROCEDURE — 4A023N7 MEASUREMENT OF CARDIAC SAMPLING AND PRESSURE, LEFT HEART, PERCUTANEOUS APPROACH: ICD-10-PCS | Performed by: INTERNAL MEDICINE

## 2020-12-07 PROCEDURE — 84132 ASSAY OF SERUM POTASSIUM: CPT | Performed by: INTERNAL MEDICINE

## 2020-12-07 PROCEDURE — 99152 MOD SED SAME PHYS/QHP 5/>YRS: CPT

## 2020-12-07 RX ORDER — MIDAZOLAM HYDROCHLORIDE 1 MG/ML
INJECTION INTRAMUSCULAR; INTRAVENOUS
Status: COMPLETED
Start: 2020-12-07 | End: 2020-12-07

## 2020-12-07 RX ORDER — LIDOCAINE HYDROCHLORIDE 20 MG/ML
INJECTION, SOLUTION EPIDURAL; INFILTRATION; INTRACAUDAL; PERINEURAL
Status: COMPLETED
Start: 2020-12-07 | End: 2020-12-07

## 2020-12-07 RX ORDER — ASPIRIN 81 MG/1
324 TABLET, CHEWABLE ORAL ONCE
Status: COMPLETED | OUTPATIENT
Start: 2020-12-07 | End: 2020-12-07

## 2020-12-07 NOTE — DISCHARGE SUMMARY
General Cardiology Progress Note   Bethel Meza 59 y o  male MRN: [id number]  Unit/Bed#: [facility] 12-46 Encounter: [id number]      Assessment:  Principal Problem:    Acute respiratory failure with hypoxia  Active Problems:    Tobacco abuse    Diabetes mellitus type 2 - Diabetic neuropathy    PAD    Essential hypertension    Acute on chronic diastolic CHF    Acute kidney injury    Obstructive uropathy    Volume overload    Non-rheumatic mitral regurgitation    Chordae tendineae rupture - Severe mitral regurgitation    Quadriplegia - history of Traumatic cervical injury    Non-MI elevated troponin    Hypomagnesemia - Hypokalemia    BPH (benign prostatic hyperplasia)      Impression:    Flail posterior mitral valve leaflet with severe MR  - felt to be a nonsurgical candidate, and deemed not appropriate for MitraClip by CT surgery  - he has preserved LV function, has done well with introduction of diuretics, currently asymptomatic    Acute diastolic CHF due to severe MR - possibly hypovolemic, borderline positive orthostatic blood pressures yesterday  Has been having lightheadedness  Hypertension-controlled  Mild aortic root enlargement -42 mm    Plan:    Orthostatics were borderline positive, would decrease torsemide to once daily  Suspect he might be slightly hypovolemic with aggressive torsemide regimen, not previously on a diuretic  Check BMP    Subjective:   Patient seen and examined  No complaints today  Denies shortness of breath  No orthopnea  No chest pain  No palpitation  Still some slight lightheadedness, and orthostatics yesterday were notable for a drop in blood pressure but not truly orthostatic, and not hypotensive  Objective   Vitals: Blood pressure 120/72, pulse 88, temperature 97 6 °F (36 4 °C), temperature source Oral, resp  rate 17, height 5' 10" (1 778 m), weight 77 3 kg (170 lb 6 7 oz), SpO2 95 %  , Body mass index is 24 45 kg/m² , I/O last 3 completed shifts:   In: 990 [P O :990]  Out: 1600 General Medicine Discharge Summary     Patient ID:  Jonn Cramer  56year old  10/12/1960    Admit date: 12/5/2020    Discharge date and time: 12/7/20    Attending Physician: DO Lizeth Pastor [[id number]]  I/O this shift:  In: 180 [P O :180]  Out: 0   Wt Readings from Last 3 Encounters:   02/14/19 77 3 kg (170 lb 6 7 oz)   10/10/18 83 1 kg (183 lb 3 2 oz)   08/28/18 80 7 kg (178 lb)       Intake/Output Summary (Last 24 hours) at 2/14/2019 0952  Last data filed at 2/14/2019 0843  Gross per 24 hour   Intake 640 ml   Output 1700 ml   Net -1060 ml     I/O last 3 completed shifts:   In: 18 [P O :990]  Out: 3375 [Urine:3375]      Physical Exam:    GEN: Lore Scott appears comfortable, no apparent distress  NECK:  No JVD  HEART:  Regular rate and rhythm, 3/6 systolic murmur across the precordium  LUNGS:, no rales  Clear bilaterally  ABDOMEN:  Soft, nontender  EXTREMITIES:  Pulses present, warm, no edema      Meds/Allergies   Allergies   Allergen Reactions    Seasonal Ic  [Cholestatin]        Current Facility-Administered Medications:      EMS REPLENISHMENT MED, , Does not apply, Once, Simeon Chaudhari MD    acetaminophen (TYLENOL) tablet 975 mg, 975 mg, Oral, Q8H [facility], Sera Delgado PA-C, 975 mg at 02/14/19 4734    atorvastatin (LIPITOR) tablet 40 mg, 40 mg, Oral, Daily With Bunny Torrez MD, 40 mg at 02/13/19 1701    baclofen tablet 20 mg, 20 mg, Oral, TID, Kalile Alarcon MD, 20 mg at 02/14/19 0914    citalopram (CeleXA) tablet 20 mg, 20 mg, Oral, Daily, Sera Delgado PA-C, 20 mg at 02/14/19 0915    clopidogrel (PLAVIX) tablet 75 mg, 75 mg, Oral, Daily, Kallie Alarcon MD, 75 mg at 02/14/19 0914    docusate sodium (COLACE) capsule 100 mg, 100 mg, Oral, BID, Kallie Alarcon MD, 100 mg at 02/13/19 1702    gabapentin (NEURONTIN) capsule 300 mg, 300 mg, Oral, BID, Kallie Alarcon MD, 300 mg at 02/14/19 0915    heparin (porcine) subcutaneous injection 5,000 Units, 5,000 Units, Subcutaneous, Q8H [facility], [facility] CHERELLE Adame, 5,000 Units at 02/14/19 0697    insulin glargine (LANTUS) subcutaneous injection 42 Units 0 42 mL, 42 Units, Subcutaneous, HS, Akash Mackey MD, 42 Units at loss     Vertigo  -he states this is intermittent, has been occurring for sometime. Based on history suspect maybe BPPV.  Physical therapy referral for vestibular therapy ordered for outpatient     Cardiac catheterization     Procedure Performed: KORI RICE 02/13/19 2206    insulin lispro (HumaLOG) 100 units/mL subcutaneous injection 18 Units, 18 Units, Subcutaneous, TID With Meals, Elisabet Todd MD, 18 Units at 02/14/19 0923    insulin lispro (HumaLOG) 100 units/mL subcutaneous injection 2-12 Units, 2-12 Units, Subcutaneous, TID AC, 2 Units at 02/14/19 0922 **AND** Fingerstick Glucose (POCT), , , TID AC, Dustin Ireland MD    insulin lispro (HumaLOG) 100 units/mL subcutaneous injection 2-12 Units, 2-12 Units, Subcutaneous, HS, Dustin Ireland MD, 4 Units at 02/13/19 2207    insulin lispro (HumaLOG) 100 units/mL subcutaneous injection 2-12 Units, 2-12 Units, Subcutaneous, 0200, Dustin Ireland MD, 6 Units at 02/14/19 0225    melatonin tablet 6 mg, 6 mg, Oral, HS, Wayne Aviles PA-C, 6 mg at 02/13/19 2206    nicotine (NICODERM CQ) 21 mg/24 hr TD 24 hr patch 1 patch, 1 patch, Transdermal, Daily, Mike Felder MD, 1 patch at 02/14/19 0920    nystatin (MYCOSTATIN) powder, , Topical, BID, Malik Baker MD    ondansetron [facility]) injection 4 mg, 4 mg, Intravenous, Q6H PRN, Mike Felder MD    polyethylene glycol (MIRALAX) packet 17 g, 17 g, Oral, Daily PRN, Malik Baker MD, 17 g at 02/12/19 0817    tamsulosin (FLOMAX) capsule 0 4 mg, 0 4 mg, Oral, Daily With Dinner, Mike Felder MD, 0 4 mg at 02/13/19 1701    torsemide (DEMADEX) tablet 20 mg, 20 mg, Oral, BID, Nancyann Goodell, MD, 20 mg at 02/14/19 0915    Laboratory Results:  Results from last 7 days   Lab Units 02/07/19  1257   TROPONIN I ng/mL 0 07*       CBC with diff:   Results from last 7 days   Lab Units 02/12/19  0453 02/11/19  0527 02/10/19  0433 02/09/19  0436 02/08/19  0532 02/07/19  1257   WBC Thousand/uL 6 75 9 28 5 87 8 42 12 92* 7 94   HEMOGLOBIN g/dL 13 1 13 4 13 2 12 0 10 6* 11 1*   HEMATOCRIT % 40 2 39 6 39 7 36 1* 31 7* 33 4*   MCV fL 104* 102* 102* 102* 103* 102*   PLATELETS Thousands/uL 237 214 210 191 160 170   MCH pg 34 0 34 5* 33 8 34 0 34 5* 33 8   MCHC g/dL 32 6 18   Temp:    General: Alert, awake  HEENT: atraumatic  Lungs: clear to ausculation bilaterally  Heart: RRR  Abdomen: soft, obese abdomen, non tender   Extremities: No edema  Psych: appropriate affect     Total time coordinating care for discharge: Greater 33 8 33 2 33 2 33 4 33 2   RDW % 13 7 13 7 13 6 13 7 13 7 13 6   MPV fL 10 3 10 2 10 7 10 7 10 9 10 3   NRBC AUTO /100 WBCs 0 0 0 0 0  --        CMP:  Results from last 7 days   Lab Units 02/12/19  1257 02/12/19  0453 02/11/19  0527 02/10/19  0433 02/09/19  0436 02/08/19  0759 02/08/19  0038   POTASSIUM mmol/L 4 8 5 8* 4 5 3 4* 3 4* 3 9 4 5   CHLORIDE mmol/L 105 107 105 104 101 103 102   CO2 mmol/L 28 24 25 27 30 28 27   BUN mg/dL 36* 42* 45* 50* 48* 51* 54*   CREATININE mg/dL 1 29 1 32* 1 33* 1 52* 1 62* 1 81* 1 97*   CALCIUM mg/dL 7 6* 7 6* 7 5* 8 2* 8 6 8 8 8 8   EGFR ml/min/1 73sq m 60 58 58 49 45 40 36       BMP:  Results from last 7 days   Lab Units 02/12/19  1257 02/12/19  0453 02/11/19  0527 02/10/19  0433 02/09/19  0436 02/08/19  0759 02/08/19  0038   POTASSIUM mmol/L 4 8 5 8* 4 5 3 4* 3 4* 3 9 4 5   CHLORIDE mmol/L 105 107 105 104 101 103 102   CO2 mmol/L 28 24 25 27 30 28 27   BUN mg/dL 36* 42* 45* 50* 48* 51* 54*   CREATININE mg/dL 1 29 1 32* 1 33* 1 52* 1 62* 1 81* 1 97*   CALCIUM mg/dL 7 6* 7 6* 7 5* 8 2* 8 6 8 8 8 8       NT-proBNP: No results for input(s): NTBNP in the last 72 hours       Magnesium:   Results from last 7 days   Lab Units 02/12/19  0453 02/11/19  0527 02/10/19  0433 02/09/19  0436 02/08/19  0759 02/08/19  0038   MAGNESIUM mg/dL 1 8 1 7 1 7 1 8 2 0 1 5*       Coags:   Results from last 7 days   Lab Units 02/08/19  0038 02/07/19  1257   PTT seconds 35 33   INR   --  0 99     Lipid Profile:   No results found for: CHOL  Lab Results   Component Value Date    HDL 67 (H) 02/08/2019    HDL 45 10/12/2018    HDL 63 (H) 03/14/2018     Lab Results   Component Value Date    LDLCALC 7 02/08/2019    LDLCALC 22 10/12/2018    LDLCALC 48 03/14/2018     No results found for: LDLDIRECT  Lab Results   Component Value Date    TRIG 83 02/08/2019    TRIG 97 10/12/2018    TRIG 135 03/14/2018       Cardiac testing:       Results for orders placed during the hospital encounter of 02/07/19   Echo complete with contrast if indicated    Narrative [facility] [address]  [facility] [place], [address] [facility]  [phone]    Transthoracic Echocardiogram  2D, M-mode, Doppler, and Color Doppler    Study date:  2019    Patient: Ilene Szymanski  MR number: [id number]  Account number: [de-identified]  : 1960  Age: 59 years  Gender: Male  Status: Inpatient  Location: Bedside  Height: 70 in  Weight: 175 lb  BP: 104/ 65 mmHg    Indications: Heart failure, evaluate LV and RV function  Possible pulmpnary embolism  Diagnoses: I50 9 - Heart failure, unspecified    Sonographer:  HERMINIO Lepe  Primary Physician:  Balwinder Haji MD  Referring Physician:  Rashawn Gil MD  Group:  [facility]  Interpreting Physician:  Kang Mistry MD    SUMMARY    LEFT VENTRICLE:  Systolic function was normal  Ejection fraction was estimated to be 60 %  Although no diagnostic regional wall motion abnormality was identified, this possibility cannot be completely excluded on the basis of this study  LEFT ATRIUM:  The atrium was mildly to moderately dilated  MITRAL VALVE:  There was moderate to marked annular calcification  There was severe flail motion of the posterior leaflet  Findings were suggestive of chordal rupture  Transmitral velocity was increased due to increased transvalvular flow  There was severe regurgitation  HISTORY: PRIOR HISTORY: COPD, PVD, DM2, alcohol abuse, current smoker, MRSA, spinal cord injury with some parathesia  PROCEDURE: The procedure was performed at the bedside  This was a routine study  The transthoracic approach was used  The study included complete 2D imaging, M-mode, complete spectral Doppler, and color Doppler  The heart rate was 115 bpm,  at the start of the study  Images were obtained from the parasternal, apical, subcostal, and suprasternal notch acoustic windows   Echocardiographic views were limited due to restricted patient mobility and poor acoustic window  availability  This was a technically difficult study  LEFT VENTRICLE: Size was normal  Systolic function was normal  Ejection fraction was estimated to be 60 %  Although no diagnostic regional wall motion abnormality was identified, this possibility cannot be completely excluded on the basis  of this study  Wall thickness was normal  There was no evidence of concentric hypertrophy  DOPPLER: The study was not technically sufficient to allow evaluation of LV diastolic function  RIGHT VENTRICLE: The size was normal  Systolic function was normal  Wall thickness was normal     LEFT ATRIUM: The atrium was mildly to moderately dilated  RIGHT ATRIUM: Size was normal     MITRAL VALVE: There was moderate to marked annular calcification  There was severe flail motion of the posterior leaflet  Findings were suggestive of chordal rupture  DOPPLER: Transmitral velocity was increased due to increased  transvalvular flow  There was no evidence for stenosis  There was severe regurgitation  AORTIC VALVE: The valve was trileaflet  Leaflets exhibited normal cuspal separation and sclerosis  DOPPLER: Transaortic velocity was within the normal range  There was no evidence for stenosis  There was no regurgitation  TRICUSPID VALVE: The valve structure was normal  There was normal leaflet separation  DOPPLER: The transtricuspid velocity was within the normal range  There was no evidence for stenosis  There was no regurgitation  PULMONIC VALVE: Leaflets exhibited normal thickness, no calcification, and normal cuspal separation  DOPPLER: The transpulmonic velocity was within the normal range  There was no regurgitation  PERICARDIUM: There was no pericardial effusion  The pericardium was normal in appearance  AORTA: The root exhibited normal size      SYSTEM MEASUREMENT TABLES    2D  %FS: 37 4 %  Ao Diam: 3 73 cm  EDV(Teich): 125 97 ml  EF(Cube): 75 47 %  EF(Teich): 67 08 %  ESV(Cube): 33 28 ml  ESV(Teich): 41 47 ml  IVSd: 0 93 cm  LA Diam: 4 48 cm  LVEDV MOD A4C: 104 91 ml  LVEF MOD A4C: 65 89 %  LVESV MOD A4C: 35 79 ml  LVIDd: 5 14 cm  LVIDs: 3 22 cm  LVLd A4C: 7 7 cm  LVLs A4C: 6 53 cm  LVPWd: 0 85 cm  SV MOD A4C: 69 12 ml  SV(Cube): 102 38 ml  SV(Teich): 84 5 ml    CW  AV Env  Ti: 205 18 ms  AV VTI: 19 47 cm  AV Vmax: 1 54 m/s  AV Vmean: 0 95 m/s  AV maxP 44 mmHg  AV meanP 06 mmHg  TR Vmax: 3 01 m/s  TR maxP 23 mmHg    MM  TAPSE: 2 05 cm    PW  E': 0 09 m/s  E/E': 20 01  LVOT Env  Ti: 255 08 ms  LVOT VTI: 12 07 cm  LVOT Vmax: 0 82 m/s  LVOT Vmean: 0 47 m/s  LVOT maxP 71 mmHg  LVOT meanP 12 mmHg  MV A [place]: 0 99 m/s  MV Dec Lynn: 13 68 m/s2  MV DecT: 134 58 ms  MV E [place]: 1 84 m/s  MV E/A Ratio: 1 86    [employer] Accredited Echocardiography Laboratory    Prepared and electronically signed by    Malika Stokes MD  Signed 2019 16:11:08       No results found for this or any previous visit  No results found for this or any previous visit  No results found for this or any previous visit  No results found for this or any previous visit  No results found for this or any previous visit  Katelin Zamora MD    Portions of the record may have been created with voice recognition software   Occasional wrong word or "sound a like" substitutions may have occurred due to the inherent limitations of voice recognition software   Read the chart carefully and recognize, using context, where substitutions have occurred

## 2020-12-07 NOTE — PAYOR COMM NOTE
--------------  ADMISSION REVIEW     Payor: [employer] PPO  Subscriber #:  [id number]  Authorization Number: [id number]     Admit date: 12/5/20  Admit time: 1636       Admitting Physician: Lenny Vidales DO  Attending Physician:  Lenny Vidales DO no polyps repeat 10 years, diverticulosis, internal hemorrhoids   • COLONOSCOPY, POSSIBLE BIOPSY, POSSIBLE POLYPECTOMY 79146 N/A 6/13/2020    Performed by Meño Mcfarland MD at [facility]   • KNEE ARTHROSCOPY Left 7/8/16    Dr Abdiaziz Garladn   • OT Musculoskeletal: Normal range of motion. General: No tenderness. Right lower leg: No edema. Left lower leg: No edema. Skin:     General: Skin is warm and dry. Findings: No rash.    Neurological:      Mental Status: He is alert and o CONCLUSION:  1. No acute cardiopulmonary process. 2. Mild scarring or subsegmental atelectasis at the left lung base.     Dictated by (CST): Miriam Addison MD on 12/05/2020 at 2:59 PM     Finalized by (CST): Miriam Addison MD on 12/05/2020 at 3:00 P Medications Prescribed:  Current Discharge Medication List                          Present on Admission  Date Reviewed: 11/11/2020          ICD-10-CM Noted POA    * (Principal) Unstable angina ([facility] [address] I20.0 12/5/2020 Unknown                   Signed by Marsha Engel Performed by Debi Sigala MD at [facility]   • KNEE ARTHROSCOPY Left 7/8/16    Dr Damian Singh   • OTHER SURGICAL HISTORY          ALL:    Other                   OTHER (SEE COMMENTS)    Comment:Botanicals, carba mix, cobalt dichloride, thiura 56year old male wit history of hypertension, coronary artery disease s/p KATHY to prox LAD in 2018,  hyperlipidemia, BPH, psoriasis, who presents to the hospital for evaluation of dyspnea. Dyspnea, chest discomfort  -chest pain now resolved.  Main symptom Potassium Chloride ER (K-DUR M20) CR tab 40 mEq     Date Action Dose Route User    12/6/2020 2144 Given 40 mEq Oral Beauty Kennel      tamsulosin HCl [facility]) cap 0.4 mg     Date Action Dose Route User    12/6/2020 2126 Given 0.4 mg Oral Beauty Kennel 2018 pt presented with the same sx as today, although was having more severe MEEK than he's had recently. He had an abn stress test and cath showed high grade lesions in the LAD and LCx.   Dr Talita Barker was able to stent the LAD but unable to pass a wire through • Psoriasis Paternal Cousin Male     • Psoriasis Paternal Cousin Female     • Lung Disorder Neg        He has never used smokeless tobacco.      Allergies:     Other                   OTHER (SEE COMMENTS)    Comment:Botanicals, carba mix, cobalt dichloride Neurologic: Alert and oriented, normal affect.   Skin: Warm and dry.      Laboratories and Data:     Diagnostics:  Telemetry: NSR  EK EKGs 1 hour apart show NSR, no STT abn        CXR: no acute dz     Labs:       Recent Labs     20  1433   WBC 9.4 Temp:  [97.4 °F (36.3 °C)-98.1 °F (36.7 °C)] 97.9 °F (36.6 °C)  Pulse:  [] 81  Resp:  [12-20] 18  BP: (111-204)/(66-91) 136/86  Temp (24hrs), Av.8 °F (36.6 °C), Min:97.4 °F (36.3 °C), Max:98.1 °F (36.7 °C)        Intake/Output:     Intake/Output Adriana Mondragon DO   Physician   Hospitalist   Progress Notes      Signed   Date of Service:  12/7/2020 12:24 PM               Signed             [facility] Hospitalist Progress Note      CC:  Hospital Follow up     PCP: Lynne Chapman MD         Assessment/Plan: /71 (BP Location: Right arm)   Pulse 74   Temp 97.9 °F (36.6 °C) (Oral)   Resp 18   Ht 5' 9\" (1.753 m)   Wt (!) 309 lb 12.8 oz (140.5 kg)   SpO2 96%   BMI 45.75 kg/m²      General: Alert, awake  HEENT: atraumatic  Lungs: clear to ausculation bilater

## 2020-12-07 NOTE — PROGRESS NOTES
Patient is A&Ox3. He is on room air and NSR on the monitor. All due medications given. Vital signs stable. Son at bedside. Will continue to monitor.

## 2020-12-07 NOTE — PROGRESS NOTES
[facility] Hospitalist Progress Note     CC: Hospital Follow up    PCP: Amy Chavez MD       Assessment/Plan:   56year old male wit history of hypertension, coronary artery disease s/p KATHY to prox LAD in 2018, obesity, hx of tobacco use, hyperlipidemia, BPH, atraumatic  Lungs: clear to ausculation bilaterally  Heart: RRR  Abdomen: soft, obese abdomen, non tender   Extremities: No edema  Psych: appropriate affect     Data Review:       Labs:     Recent Labs   Lab 12/05/20  1433 12/06/20  0914   RBC 4.61 4.52

## 2020-12-07 NOTE — PLAN OF CARE
Alert. No c.o pain. Potassium covered. Plan for left heart cath later today. Prep completed. Will endorse last CHG cleanse to day shift, given no scheduled time for cath as of now. Bed locked and in lowest position. Will continue to monitor.        Problem: output and hemodynamic stability  Description: INTERVENTIONS:  - Monitor vital signs, rhythm, and trends  - Monitor for bleeding, hypotension and signs of decreased cardiac output  - Evaluate effectiveness of vasoactive medications to optimize hemodynamic

## 2020-12-07 NOTE — PLAN OF CARE
Patient is alert and oriented to person, place, time, situation. 2D echo and left heart cath completed today without intervention. Right groin site is clean, dry, intact no sign or symptoms of hematoma.  Plan for discharge home this evening after prescribed for specific interventions  Outcome: Adequate for Discharge  Goal: Patient/Family Short Term Goal  Description: Patient's Short Term Goal: To be free of chest pain.      Interventions:   Monitor telemetry, promote cardiac diet, plan for ECHO  - See addition

## 2020-12-07 NOTE — PROCEDURES
[facility] Cardiac Cath Procedure Note    Mar Ortega Patient Status:  Inpatient    10/12/1960 MRN [id number]   Location [facility] Attending Kleber Son, [address] Day # 2 PCP Randy Flannery MD       Cardiologist: Marina Yañez nurse and myself during the exam 22 minutes.       Samuel Rizzo MD  12/07/20

## 2020-12-08 NOTE — PLAN OF CARE
Alert. No c/o pain. Night time medications given. Cath recovery completed. Dressing CDI. Son to take pt home. Pt received discharge instructions during day shift. No further education required. Tele removed. IV removed. Pt discharged with son.        Proble and hemodynamic stability  Description: INTERVENTIONS:  - Monitor vital signs, rhythm, and trends  - Monitor for bleeding, hypotension and signs of decreased cardiac output  - Evaluate effectiveness of vasoactive medications to optimize hemodynamic stabili

## 2020-12-09 NOTE — PAYOR COMM NOTE
Payor:  [employer]  Subscriber #:  [id number]  Authorization Number: [id number]     Admit date: 12/5/20  Admit time:  1636  Discharge Date: 12/7/2020

## 2021-05-12 PROBLEM — B35.1 ONYCHOMYCOSIS OF TOENAIL: Status: ACTIVE | Noted: 2021-05-12

## 2021-06-17 PROBLEM — E66.01 MORBID OBESITY (HCC): Status: ACTIVE | Noted: 2021-06-17

## 2021-06-30 ENCOUNTER — HOSPITAL ENCOUNTER (EMERGENCY)
Facility: HOSPITAL | Age: 61
Discharge: HOME | End: 2021-06-30
Attending: EMERGENCY MEDICINE
Payer: MEDICARE

## 2021-06-30 VITALS
BODY MASS INDEX: 45.61 KG/M2 | HEART RATE: 87 BPM | SYSTOLIC BLOOD PRESSURE: 145 MMHG | TEMPERATURE: 98 F | WEIGHT: 315 LBS | OXYGEN SATURATION: 96 % | HEIGHT: 69.5 IN | DIASTOLIC BLOOD PRESSURE: 91 MMHG | RESPIRATION RATE: 20 BRPM

## 2021-06-30 DIAGNOSIS — S27.818A ABRASION OF ESOPHAGUS, INITIAL ENCOUNTER: Primary | ICD-10-CM

## 2021-06-30 PROCEDURE — 99283 EMERGENCY DEPT VISIT LOW MDM: CPT

## 2021-06-30 RX ORDER — PANTOPRAZOLE SODIUM 40 MG/1
40 TABLET, DELAYED RELEASE ORAL DAILY
Qty: 14 TABLET | Refills: 0 | Status: SHIPPED | OUTPATIENT
Start: 2021-06-30 | End: 2021-07-14

## 2021-06-30 RX ORDER — MAGNESIUM HYDROXIDE/ALUMINUM HYDROXICE/SIMETHICONE 120; 1200; 1200 MG/30ML; MG/30ML; MG/30ML
10 SUSPENSION ORAL 4 TIMES DAILY PRN
Qty: 355 ML | Refills: 0 | Status: SHIPPED | OUTPATIENT
Start: 2021-06-30 | End: 2021-07-07

## 2021-06-30 NOTE — ED INITIAL ASSESSMENT (HPI)
Patient c/o having a pretzel stuck in his throat since 2 hours pta.  States he has been unable to get the pretzel down with \"a ton of water\" and a xanax at 11 AM.

## 2021-06-30 NOTE — ED PROVIDER NOTES
Patient Seen in: [facility] AND [facility] Emergency Department      History   Patient presents with:  FB in Throat    Stated Complaint:     HPI/Subjective:   HPI  Patient is a 73-year-old male history of anxiety, hypertension, hyperlipidemia presenting with for Systems    Positive for stated complaint:   Other systems are as noted in HPI. Constitutional and vital signs reviewed. All other systems reviewed and negative except as noted above.     Physical Exam     ED Triage Vitals [06/30/21 1335]   BP (!) 198/ history of anxiety presenting with esophageal foreign body sensation after swallowing pretzels 2 hours prior to arrival.  Arrived anxious, tachycardic and hypertensive, alert and oriented x3 and tolerating secretions, speaking full sentences, no respirator

## 2021-08-12 PROBLEM — H25.13 AGE-RELATED NUCLEAR CATARACT OF BOTH EYES: Status: ACTIVE | Noted: 2021-05-24

## 2021-08-12 PROBLEM — H35.712 CENTRAL SEROUS CHORIORETINOPATHY OF EYE, LEFT: Status: ACTIVE | Noted: 2021-05-24

## 2021-09-13 PROBLEM — E11.9 DIABETES MELLITUS TYPE II, NON INSULIN DEPENDENT (HCC): Status: ACTIVE | Noted: 2021-09-13

## 2021-09-13 PROBLEM — R07.89 ATYPICAL CHEST PAIN: Status: ACTIVE | Noted: 2021-09-13

## 2021-09-13 PROBLEM — Z95.5 HISTORY OF CORONARY ARTERY STENT PLACEMENT: Status: ACTIVE | Noted: 2021-09-13

## (undated) NOTE — LETTER
February 9, 2018    [facility]  [address] [place] [address]      Dear Ranjit Joy: The following are the results of your recent tests ordered by [facility]. Please review the list of test results.   Your result is the number If you have any questions about these test results, you may contact one of the nurses at our office.         Sincerely Yours,  [facility] - Cardiology Department

## (undated) NOTE — LETTER
[address], [place]  Authorization for Invasive Procedures  1.  I hereby authorize Dr. Cornell Adler , my physician and whomever may be designated as the doctor's assistant, to perform the following operation and/or procedure:  Cardiac cat 5. I consent to the photographing of the operations or procedures to be performed for the purposes of advancing medicine, science, and/or education, provided my identity is not revealed.  If the procedure has been videotaped, the physician/surgeon will obta __________ Time: ___________    Statement of Physician  My signature below affirms that prior to the time of the procedure, I have explained to the patient and/or his legal representative, the risks and benefits involved in the proposed treatment and any r